# Patient Record
Sex: FEMALE
[De-identification: names, ages, dates, MRNs, and addresses within clinical notes are randomized per-mention and may not be internally consistent; named-entity substitution may affect disease eponyms.]

---

## 2018-04-11 ENCOUNTER — HOSPITAL ENCOUNTER (INPATIENT)
Dept: HOSPITAL 31 - C.ER | Age: 43
LOS: 3 days | Discharge: LEFT BEFORE DISCHARGE | DRG: 89 | End: 2018-04-14
Attending: PSYCHIATRY & NEUROLOGY | Admitting: PSYCHIATRY & NEUROLOGY
Payer: COMMERCIAL

## 2018-04-11 DIAGNOSIS — L85.0: ICD-10-CM

## 2018-04-11 DIAGNOSIS — F41.9: ICD-10-CM

## 2018-04-11 DIAGNOSIS — J18.9: Primary | ICD-10-CM

## 2018-04-11 DIAGNOSIS — F32.9: ICD-10-CM

## 2018-04-11 DIAGNOSIS — R63.4: ICD-10-CM

## 2018-04-11 DIAGNOSIS — F11.10: ICD-10-CM

## 2018-04-11 DIAGNOSIS — F14.90: ICD-10-CM

## 2018-04-11 DIAGNOSIS — Z59.0: ICD-10-CM

## 2018-04-11 DIAGNOSIS — F17.210: ICD-10-CM

## 2018-04-11 LAB
ALBUMIN SERPL-MCNC: 4 G/DL (ref 3.5–5)
ALBUMIN/GLOB SERPL: 1.1 {RATIO} (ref 1–2.1)
ALT SERPL-CCNC: 9 U/L (ref 9–52)
AST SERPL-CCNC: 24 U/L (ref 14–36)
BACTERIA #/AREA URNS HPF: (no result) /[HPF]
BASOPHILS # BLD AUTO: 0.1 K/UL (ref 0–0.2)
BASOPHILS NFR BLD: 0.7 % (ref 0–2)
BILIRUB UR-MCNC: NEGATIVE MG/DL
BUN SERPL-MCNC: 15 MG/DL (ref 7–17)
CALCIUM SERPL-MCNC: 8.6 MG/DL (ref 8.6–10.4)
EOSINOPHIL # BLD AUTO: 0.1 K/UL (ref 0–0.7)
EOSINOPHIL NFR BLD: 0.7 % (ref 0–4)
ERYTHROCYTE [DISTWIDTH] IN BLOOD BY AUTOMATED COUNT: 13.9 % (ref 11.5–14.5)
GFR NON-AFRICAN AMERICAN: > 60
GLUCOSE UR STRIP-MCNC: NORMAL MG/DL
HCG,QUALITATIVE URINE: NEGATIVE
HGB BLD-MCNC: 11.4 G/DL (ref 11–16)
LEUKOCYTE ESTERASE UR-ACNC: NEGATIVE LEU/UL
LYMPHOCYTES # BLD AUTO: 2.6 K/UL (ref 1–4.3)
LYMPHOCYTES NFR BLD AUTO: 17.2 % (ref 20–40)
MCH RBC QN AUTO: 27.7 PG (ref 27–31)
MCHC RBC AUTO-ENTMCNC: 33.4 G/DL (ref 33–37)
MCV RBC AUTO: 83 FL (ref 81–99)
MONOCYTES # BLD: 1 K/UL (ref 0–0.8)
MONOCYTES NFR BLD: 6.8 % (ref 0–10)
NEUTROPHILS # BLD: 11.5 K/UL (ref 1.8–7)
NEUTROPHILS NFR BLD AUTO: 74.6 % (ref 50–75)
NRBC BLD AUTO-RTO: 0 % (ref 0–2)
PH UR STRIP: 6 [PH] (ref 5–8)
PLATELET # BLD: 291 K/UL (ref 130–400)
PMV BLD AUTO: 7 FL (ref 7.2–11.7)
PROT UR STRIP-MCNC: NEGATIVE MG/DL
RBC # BLD AUTO: 4.13 MIL/UL (ref 3.8–5.2)
RBC # UR STRIP: (no result) /UL
SP GR UR STRIP: 1.02 (ref 1–1.03)
SQUAMOUS EPITHIAL: 9 /HPF (ref 0–5)
UROBILINOGEN UR-MCNC: 4 MG/DL (ref 0.2–1)
WBC # BLD AUTO: 15.4 K/UL (ref 4.8–10.8)

## 2018-04-11 SDOH — ECONOMIC STABILITY - HOUSING INSECURITY: HOMELESSNESS: Z59.0

## 2018-04-11 NOTE — CP.PCM.HP
<Liu,Weilin - Last Filed: 04/12/18 01:30>





History of Present Illness





- History of Present Illness


History of Present Illness: 





CC: fever





42 year old female with past medical history of polysubstance abuse presents to 

ED today complaining of fever, chills, sore throat and coughs. Patient reports 

all her symptoms started 3 days ago and have been becoming worse. She has been 

coughing up dark yellow phlegms. Patient is homeless and recently left AMA from 

[facility] detox unit. Patient has been using heroin and cocaine daily for 

the past 2 years. She injects approximately 30 bags of heroin daily. Her last 

known heroin and cocaine use was 3 hours prior to ED arrival. Patient also 

reports to have unintentional weight loss, but unable to quantify the amount of 

period of time. Patient states that she is very tired and fell asleep multiple 

times during the entire H&P. Patient denies headache, shortness of breath, 

chest pain, nausea, vomiting, diarrhea, constipation, or urinary changes.





PMD: none


PMHx: denies


PSHx: denies


Allergy: NKDA


Family Hx: denies


Social Hx: Smokes 1.5 pack of cigarettes daily, alcohol 1 can of beer daily, 

heroin 30 bags daily, cocaine for the past 2 years


Home Meds: none





Present on Admission





- Present on Admission


Any Indicators Present on Admission: No





Review of Systems





- Review of Systems


Systems not reviewed;Unavailable: Other (Somnolence)





- Constitutional


Constitutional: As Per HPI, Chills, Fever, Lethargy, Weight Loss.  absent: 

Headache





- EENT


Eyes: As Per HPI.  absent: Change in Vision, Decreased Night Vision, Discharge


Ears: As Per HPI.  absent: Dizziness


Nose/Mouth/Throat: As Per HPI.  absent: Epistaxis, Nasal Obstruction, Nasal 

Trauma





- Breasts


Breasts: As Per HPI





- Cardiovascular


Cardiovascular: As Per HPI.  absent: Chest Pain, Dyspnea, Edema





- Respiratory


Respiratory: As Per HPI, Cough, Dyspnea.  absent: Hemoptysis





- Gastrointestinal


Gastrointestinal: As Per HPI.  absent: Constipation, Diarrhea, Nausea, Vomiting





- Genitourinary


Genitourinary: As Per HPI.  absent: Dysuria





- Reproductive: Female


Reproductive:Female: As Per HPI





- Menstruation


Menstruation: As Per HPI





- Musculoskeletal


Musculoskeletal: As Per HPI





- Integumentary


Integumentary: As Per HPI, Other (bilateral hands frostbites)





- Neurological


Neurological: As Per HPI.  absent: Dizziness, Syncope, Vertigo





- Psychiatric


Psychiatric: As Per HPI.  absent: Visual Hallucinations





- Endocrine


Endocrine: As Per HPI





- Hematologic/Lymphatic


Hematologic: As Per HPI





Past Patient History





- Past Social History


Smoking Status: Heavy Smoker > 10 Cigarettes Daily





- PSYCHIATRIC


Hx Anxiety: Yes


Hx Depression: Yes


Hx Substance Use: Yes





- SURGICAL HISTORY


Hx Surgeries: No





- ANESTHESIA


Hx Anesthesia: No





Meds


Allergies/Adverse Reactions: 


 Allergies











Allergy/AdvReac Type Severity Reaction Status Date / Time


 


No Known Allergies Allergy   Verified 04/11/18 20:59














Physical Exam





- Constitutional


Appears: Non-toxic, No Acute Distress, Cachectic, Other (Somnolence, poor 

hygiene)





- Head Exam


Head Exam: ATRAUMATIC, NORMOCEPHALIC





- Eye Exam


Eye Exam: EOMI, Normal appearance


Additional comments: 





sluggish pupil reaction to light





- ENT Exam


ENT Exam: Mucous Membranes Moist


Additional comments: 





poor dentition





- Neck Exam


Neck exam: Positive for: Normal Inspection





- Respiratory Exam


Respiratory Exam: Decreased Breath Sounds, Clear to Auscultation Bilateral, 

NORMAL BREATHING PATTERN.  absent: Respiratory Distress





- Cardiovascular Exam


Cardiovascular Exam: REGULAR RHYTHM, +S1, +S2





- GI/Abdominal Exam


GI & Abdominal Exam: Normal Bowel Sounds, Soft.  absent: Tenderness





- Extremities Exam


Extremities exam: Positive for: pedal pulses present.  Negative for: normal 

inspection, tenderness


Additional comments: 





multiple track marks on bilateral forearm and antecubital fossa





- Neurological Exam


Neurological exam: Alert, Oriented x3





- Psychiatric Exam


Psychiatric exam: Normal Affect, Normal Mood





- Skin


Skin Exam: Dry, Warm





Results





- Vital Signs


Recent Vital Signs: 





 Last Vital Signs











Temp  101.2 F H  04/11/18 20:51


 


Pulse  108 H  04/11/18 20:51


 


Resp  16   04/11/18 20:51


 


BP  105/68   04/11/18 20:51


 


Pulse Ox  96   04/11/18 23:10














- Labs


Result Diagrams: 


 04/11/18 22:12





 04/11/18 22:12


Labs: 





 Laboratory Results - last 24 hr











  04/11/18 04/11/18 04/11/18





  21:32 21:32 22:12


 


WBC    15.4 H


 


RBC    4.13


 


Hgb    11.4


 


Hct    34.3


 


MCV    83.0


 


MCH    27.7


 


MCHC    33.4


 


RDW    13.9


 


Plt Count    291


 


MPV    7.0 L


 


Neut % (Auto)    74.6


 


Lymph % (Auto)    17.2 L


 


Mono % (Auto)    6.8


 


Eos % (Auto)    0.7


 


Baso % (Auto)    0.7


 


Neut # (Auto)    11.5 H


 


Lymph # (Auto)    2.6


 


Mono # (Auto)    1.0 H


 


Eos # (Auto)    0.1


 


Baso # (Auto)    0.1


 


Sodium   


 


Potassium   


 


Chloride   


 


Carbon Dioxide   


 


Anion Gap   


 


BUN   


 


Creatinine   


 


Est GFR ( Amer)   


 


Est GFR (Non-Af Amer)   


 


Random Glucose   


 


Lactic Acid   


 


Calcium   


 


Total Bilirubin   


 


AST   


 


ALT   


 


Alkaline Phosphatase   


 


Total Protein   


 


Albumin   


 


Globulin   


 


Albumin/Globulin Ratio   


 


Urine Color  Yellow  


 


Urine Clarity  Hazy  


 


Urine pH  6.0  


 


Ur Specific Gravity  1.025  


 


Urine Protein  Negative  


 


Urine Glucose (UA)  Normal  


 


Urine Ketones  Negative  


 


Urine Blood  2+ H  


 


Urine Nitrate  Negative  


 


Urine Bilirubin  Negative  


 


Urine Urobilinogen  4.0 H  


 


Ur Leukocyte Esterase  Negative  


 


Urine WBC (Auto)  4  


 


Urine RBC (Auto)  12 H  


 


Ur Squamous Epith Cells  9 H  


 


Ur Transition Epith Cell  < 1  


 


Urine Bacteria  Occ H  


 


Urine HCG, Qual  Negative  


 


Urine Opiates Screen   Positive H 


 


Urine Methadone Screen   Negative 


 


Ur Barbiturates Screen   Negative 


 


Ur Phencyclidine Scrn   Negative 


 


Ur Amphetamines Screen   Negative 


 


U Benzodiazepines Scrn   Negative 


 


U Oth Cocaine Metabols   Positive H 


 


U Cannabinoids Screen   Negative 


 


Alcohol, Quantitative   


 


Influenza Typ A,B (EIA)   














  04/11/18 04/11/18 04/11/18





  22:12 22:30 22:56


 


WBC   


 


RBC   


 


Hgb   


 


Hct   


 


MCV   


 


MCH   


 


MCHC   


 


RDW   


 


Plt Count   


 


MPV   


 


Neut % (Auto)   


 


Lymph % (Auto)   


 


Mono % (Auto)   


 


Eos % (Auto)   


 


Baso % (Auto)   


 


Neut # (Auto)   


 


Lymph # (Auto)   


 


Mono # (Auto)   


 


Eos # (Auto)   


 


Baso # (Auto)   


 


Sodium  136  


 


Potassium  4.1  


 


Chloride  98  


 


Carbon Dioxide  25  


 


Anion Gap  17  


 


BUN  15  


 


Creatinine  0.7  


 


Est GFR ( Amer)  > 60  


 


Est GFR (Non-Af Amer)  > 60  


 


Random Glucose  112 H  


 


Lactic Acid    0.5 L


 


Calcium  8.6  


 


Total Bilirubin  0.5  


 


AST  24  


 


ALT  9  


 


Alkaline Phosphatase  81  


 


Total Protein  7.7  


 


Albumin  4.0  


 


Globulin  3.7  


 


Albumin/Globulin Ratio  1.1  


 


Urine Color   


 


Urine Clarity   


 


Urine pH   


 


Ur Specific Gravity   


 


Urine Protein   


 


Urine Glucose (UA)   


 


Urine Ketones   


 


Urine Blood   


 


Urine Nitrate   


 


Urine Bilirubin   


 


Urine Urobilinogen   


 


Ur Leukocyte Esterase   


 


Urine WBC (Auto)   


 


Urine RBC (Auto)   


 


Ur Squamous Epith Cells   


 


Ur Transition Epith Cell   


 


Urine Bacteria   


 


Urine HCG, Qual   


 


Urine Opiates Screen   


 


Urine Methadone Screen   


 


Ur Barbiturates Screen   


 


Ur Phencyclidine Scrn   


 


Ur Amphetamines Screen   


 


U Benzodiazepines Scrn   


 


U Oth Cocaine Metabols   


 


U Cannabinoids Screen   


 


Alcohol, Quantitative  < 10  


 


Influenza Typ A,B (EIA)   Negative for flu a/b 














Assessment & Plan





- Assessment and Plan (Free Text)


Assessment: 





Pneumonia


-Leukocytosis 15.4, fever 101.2, lactate 0.5


-Rapid flu negative


-CXR no active disease


-Rocephin IV 1gm daily


-Azithromycin IV 500mg daily


-Follow up sputum, blood and urine cultures


-Follow up legionella ag, mycoplasma pneumoniae, HIV


-Tylenol for fever





Polysubstance abuse


-UDS positive for opiates and cocaine


-Smoking cessation was strongly advised


-Nicoderm patch daily





Unintentional weight loss


-history IVDA


-Follow up HIV


-monitor labs





Prophylactic measures


-Protonix


-Lovenox





<Lewis Sheridan - Last Filed: 04/12/18 06:04>





Results





- Vital Signs


Recent Vital Signs: 





 Last Vital Signs











Temp  98.6 F   04/12/18 00:24


 


Pulse  74   04/12/18 00:24


 


Resp  20   04/12/18 00:24


 


BP  100/61   04/12/18 00:24


 


Pulse Ox  99   04/12/18 00:24














- Labs


Result Diagrams: 


 04/11/18 22:12





 04/11/18 22:12


Labs: 





 Laboratory Results - last 24 hr











  04/11/18 04/11/18 04/11/18





  21:32 21:32 22:12


 


WBC    15.4 H


 


RBC    4.13


 


Hgb    11.4


 


Hct    34.3


 


MCV    83.0


 


MCH    27.7


 


MCHC    33.4


 


RDW    13.9


 


Plt Count    291


 


MPV    7.0 L


 


Neut % (Auto)    74.6


 


Lymph % (Auto)    17.2 L


 


Mono % (Auto)    6.8


 


Eos % (Auto)    0.7


 


Baso % (Auto)    0.7


 


Neut # (Auto)    11.5 H


 


Lymph # (Auto)    2.6


 


Mono # (Auto)    1.0 H


 


Eos # (Auto)    0.1


 


Baso # (Auto)    0.1


 


Sodium   


 


Potassium   


 


Chloride   


 


Carbon Dioxide   


 


Anion Gap   


 


BUN   


 


Creatinine   


 


Est GFR ( Amer)   


 


Est GFR (Non-Af Amer)   


 


POC Glucose (mg/dL)   


 


Random Glucose   


 


Lactic Acid   


 


Calcium   


 


Total Bilirubin   


 


AST   


 


ALT   


 


Alkaline Phosphatase   


 


Total Protein   


 


Albumin   


 


Globulin   


 


Albumin/Globulin Ratio   


 


Urine Color  Yellow  


 


Urine Clarity  Hazy  


 


Urine pH  6.0  


 


Ur Specific Gravity  1.025  


 


Urine Protein  Negative  


 


Urine Glucose (UA)  Normal  


 


Urine Ketones  Negative  


 


Urine Blood  2+ H  


 


Urine Nitrate  Negative  


 


Urine Bilirubin  Negative  


 


Urine Urobilinogen  4.0 H  


 


Ur Leukocyte Esterase  Negative  


 


Urine WBC (Auto)  4  


 


Urine RBC (Auto)  12 H  


 


Ur Squamous Epith Cells  9 H  


 


Ur Transition Epith Cell  < 1  


 


Urine Bacteria  Occ H  


 


Urine HCG, Qual  Negative  


 


Urine Opiates Screen   Positive H 


 


Urine Methadone Screen   Negative 


 


Ur Barbiturates Screen   Negative 


 


Ur Phencyclidine Scrn   Negative 


 


Ur Amphetamines Screen   Negative 


 


U Benzodiazepines Scrn   Negative 


 


U Oth Cocaine Metabols   Positive H 


 


U Cannabinoids Screen   Negative 


 


Alcohol, Quantitative   


 


Influenza Typ A,B (EIA)   














  04/11/18 04/11/18 04/11/18





  22:12 22:23 22:30


 


WBC   


 


RBC   


 


Hgb   


 


Hct   


 


MCV   


 


MCH   


 


MCHC   


 


RDW   


 


Plt Count   


 


MPV   


 


Neut % (Auto)   


 


Lymph % (Auto)   


 


Mono % (Auto)   


 


Eos % (Auto)   


 


Baso % (Auto)   


 


Neut # (Auto)   


 


Lymph # (Auto)   


 


Mono # (Auto)   


 


Eos # (Auto)   


 


Baso # (Auto)   


 


Sodium  136  


 


Potassium  4.1  


 


Chloride  98  


 


Carbon Dioxide  25  


 


Anion Gap  17  


 


BUN  15  


 


Creatinine  0.7  


 


Est GFR ( Amer)  > 60  


 


Est GFR (Non-Af Amer)  > 60  


 


POC Glucose (mg/dL)   97 


 


Random Glucose  112 H  


 


Lactic Acid   


 


Calcium  8.6  


 


Total Bilirubin  0.5  


 


AST  24  


 


ALT  9  


 


Alkaline Phosphatase  81  


 


Total Protein  7.7  


 


Albumin  4.0  


 


Globulin  3.7  


 


Albumin/Globulin Ratio  1.1  


 


Urine Color   


 


Urine Clarity   


 


Urine pH   


 


Ur Specific Gravity   


 


Urine Protein   


 


Urine Glucose (UA)   


 


Urine Ketones   


 


Urine Blood   


 


Urine Nitrate   


 


Urine Bilirubin   


 


Urine Urobilinogen   


 


Ur Leukocyte Esterase   


 


Urine WBC (Auto)   


 


Urine RBC (Auto)   


 


Ur Squamous Epith Cells   


 


Ur Transition Epith Cell   


 


Urine Bacteria   


 


Urine HCG, Qual   


 


Urine Opiates Screen   


 


Urine Methadone Screen   


 


Ur Barbiturates Screen   


 


Ur Phencyclidine Scrn   


 


Ur Amphetamines Screen   


 


U Benzodiazepines Scrn   


 


U Oth Cocaine Metabols   


 


U Cannabinoids Screen   


 


Alcohol, Quantitative  < 10  


 


Influenza Typ A,B (EIA)    Negative for flu a/b














  04/11/18





  22:56


 


WBC 


 


RBC 


 


Hgb 


 


Hct 


 


MCV 


 


MCH 


 


MCHC 


 


RDW 


 


Plt Count 


 


MPV 


 


Neut % (Auto) 


 


Lymph % (Auto) 


 


Mono % (Auto) 


 


Eos % (Auto) 


 


Baso % (Auto) 


 


Neut # (Auto) 


 


Lymph # (Auto) 


 


Mono # (Auto) 


 


Eos # (Auto) 


 


Baso # (Auto) 


 


Sodium 


 


Potassium 


 


Chloride 


 


Carbon Dioxide 


 


Anion Gap 


 


BUN 


 


Creatinine 


 


Est GFR ( Amer) 


 


Est GFR (Non-Af Amer) 


 


POC Glucose (mg/dL) 


 


Random Glucose 


 


Lactic Acid  0.5 L


 


Calcium 


 


Total Bilirubin 


 


AST 


 


ALT 


 


Alkaline Phosphatase 


 


Total Protein 


 


Albumin 


 


Globulin 


 


Albumin/Globulin Ratio 


 


Urine Color 


 


Urine Clarity 


 


Urine pH 


 


Ur Specific Gravity 


 


Urine Protein 


 


Urine Glucose (UA) 


 


Urine Ketones 


 


Urine Blood 


 


Urine Nitrate 


 


Urine Bilirubin 


 


Urine Urobilinogen 


 


Ur Leukocyte Esterase 


 


Urine WBC (Auto) 


 


Urine RBC (Auto) 


 


Ur Squamous Epith Cells 


 


Ur Transition Epith Cell 


 


Urine Bacteria 


 


Urine HCG, Qual 


 


Urine Opiates Screen 


 


Urine Methadone Screen 


 


Ur Barbiturates Screen 


 


Ur Phencyclidine Scrn 


 


Ur Amphetamines Screen 


 


U Benzodiazepines Scrn 


 


U Oth Cocaine Metabols 


 


U Cannabinoids Screen 


 


Alcohol, Quantitative 


 


Influenza Typ A,B (EIA) 














Assessment & Plan





- Date & Time


Date: 04/12/18 (I have seen and examined the patient.  I agree with the 

findings and plan of care as documented by Dr. Calle.  Patient with pneumonia.  

Rocephin and Azithromycin.  Oxygen as needed.  Sputum and blood cultures.  Also 

with polysubstance abuse.  Consider psych consult.  Monitor for acute changes.)


Time: 06:03





Attending/Attestation





- Attestation


I have personally seen and examined this patient.: Yes


I have fully participated in the care of the patient.: Yes


I have reviewed all pertinent clinical information: Yes

## 2018-04-11 NOTE — C.PDOC
History Of Present Illness


42 year old female brought to the ED by friend for evaluation of cough and cold 

symptoms x 2 days. Of note, patient is homeless and has a history of heroin and 

crack cocaine abuse. Patient states she has been living on the street instead 

of staying in a shelter. On arrival, patient is febrile, with temp of 101. 

Patient also reports significant weight loss over the past month. 





Time Seen by Provider: 04/11/18 21:16


Chief Complaint (Nursing): Flu-like Symptoms


History Per: Patient


History/Exam Limitations: no limitations


Onset/Duration Of Symptoms: Days (x2)


Current Symptoms Are (Timing): Still Present


Associated Symptoms: Fever, Cough





Past Medical History


Reviewed: Historical Data, Nursing Documentation, Vital Signs


Vital Signs: 


 Last Vital Signs











Temp  101.2 F H  04/11/18 20:51


 


Pulse  108 H  04/11/18 20:51


 


Resp  16   04/11/18 20:51


 


BP  105/68   04/11/18 20:51


 


Pulse Ox  96   04/11/18 22:44














- Medical History


PMH: Anxiety, Depression


Surgical History: No Surg Hx


Family History: States: No Known Family Hx





- Social History


Hx Tobacco Use: Yes


Hx Alcohol Use: Yes


Hx Substance Use: Yes





- Immunization History


Hx Influenza Vaccination: No


Hx Pneumococcal Vaccination: No





Review Of Systems


Except As Marked, All Systems Reviewed And Found Negative.


Constitutional: Positive for: Fever


ENT: Positive for: Nose Congestion


Cardiovascular: Negative for: Chest Pain


Respiratory: Positive for: Cough.  Negative for: Shortness of Breath


Gastrointestinal: Negative for: Vomiting, Diarrhea





Physical Exam





- Physical Exam


Appears: No Acute Distress, Other (Cachectic appearing)


Skin: Warm, Dry, No Rash


Head: Atraumatic, Normacephalic


Eye(s): bilateral: Normal Inspection, PERRL, EOMI


Nose: Normal


Oral Mucosa: Moist


Neck: Normal ROM


Chest: Symmetrical


Cardiovascular: Rhythm Regular, No Murmur


Respiratory: Normal Breath Sounds, No Rales, No Rhonchi, No Wheezing, Other (

Dry cough noted)


Gastrointestinal/Abdominal: Soft, No Tenderness, No Distention


Extremity: Bilateral: Atraumatic, Normal Color And Temperature, Normal ROM


Pulses: Left Dorsalis Pedis: Normal, Right Dorsalis Pedis: Normal


Neurological/Psych: Oriented x3, Normal Speech





ED Course And Treatment





- Laboratory Results


Result Diagrams: 


 04/11/18 22:12





 04/11/18 22:12


Lab Interpretation: Abnormal (WBC 15.4, mild microscopic hematuria, UDS + 

cocaine and opiates)


O2 Sat by Pulse Oximetry: 96 (RA)


Pulse Ox Interpretation: Normal





- Radiology


CXR: Interpreted by Me


CXR Interpretation: Yes: No Acute Disease


Reevaluation Time: 22:43


Reassessment Condition: Unchanged (after IV fluids)





- Physician Consult Information


Time Consulting Physician Contacted: 22:43


Physician Contacted: Lewis Sheridan


Outcome Of Conversation: Patient to be admitted for treatment of respiratory 

infection and polysubstance abuse.





Medical Decision Making


Medical Decision Making: 


Impression: 41 y/o F with fever and cough





Time: 21:24





Plan:


* CMP 


* CBC


* Urine drug screen


* Alcohol serum


* Chest x-ray


* Blood culture


* Urine culture


* UA 


* Urine HCG


* IVF hydration








Disposition





- Disposition


Disposition: HOSPITALIZED


Disposition Time: 22:44


Condition: FAIR





- POA


Present On Arrival: None





- Clinical Impression


Clinical Impression: 


 Influenza-like illness, Cough, Weight loss, Polysubstance (including opioids) 

dependence with physiol dependence








- Scribe Statement


The provider has reviewed the documentation as recorded by the Scribe (Emma Antunez)


Provider Attestation: 





All medical record entries made by the Scribe were at my direction and 

personally dictated by me. I have reviewed the chart and agree that the record 

accurately reflects my personal performance of the history, physical exam, 

medical decision making, and the department course for this patient. I have 

also personally directed, reviewed, and agree with the discharge instructions 

and disposition.

## 2018-04-12 LAB
ALBUMIN SERPL-MCNC: 3.1 G/DL (ref 3.5–5)
ALBUMIN/GLOB SERPL: 0.9 {RATIO} (ref 1–2.1)
ALT SERPL-CCNC: 10 U/L (ref 9–52)
AST SERPL-CCNC: 19 U/L (ref 14–36)
BASOPHILS # BLD AUTO: 0.1 K/UL (ref 0–0.2)
BASOPHILS NFR BLD: 0.4 % (ref 0–2)
BUN SERPL-MCNC: 7 MG/DL (ref 7–17)
CALCIUM SERPL-MCNC: 8.5 MG/DL (ref 8.6–10.4)
EOSINOPHIL # BLD AUTO: 0.1 K/UL (ref 0–0.7)
EOSINOPHIL NFR BLD: 0.9 % (ref 0–4)
ERYTHROCYTE [DISTWIDTH] IN BLOOD BY AUTOMATED COUNT: 14.1 % (ref 11.5–14.5)
GFR NON-AFRICAN AMERICAN: > 60
HEPATITIS A IGM: NEGATIVE
HEPATITIS B CORE AB: NEGATIVE
HEPATITIS C ANTIBODY: NEGATIVE
HGB BLD-MCNC: 11.4 G/DL (ref 11–16)
L PNEUMO1 AG UR QL IA: NEGATIVE
LYMPHOCYTES # BLD AUTO: 1.9 K/UL (ref 1–4.3)
LYMPHOCYTES NFR BLD AUTO: 16.1 % (ref 20–40)
MCH RBC QN AUTO: 27.9 PG (ref 27–31)
MCHC RBC AUTO-ENTMCNC: 33.3 G/DL (ref 33–37)
MCV RBC AUTO: 83.7 FL (ref 81–99)
MONOCYTES # BLD: 1 K/UL (ref 0–0.8)
MONOCYTES NFR BLD: 8.3 % (ref 0–10)
NEUTROPHILS # BLD: 8.8 K/UL (ref 1.8–7)
NEUTROPHILS NFR BLD AUTO: 74.3 % (ref 50–75)
NRBC BLD AUTO-RTO: 0 % (ref 0–2)
PLATELET # BLD: 280 K/UL (ref 130–400)
PMV BLD AUTO: 7.4 FL (ref 7.2–11.7)
RAPID PLASMA REAGIN: NONREACTIVE
RBC # BLD AUTO: 4.08 MIL/UL (ref 3.8–5.2)
WBC # BLD AUTO: 11.9 K/UL (ref 4.8–10.8)

## 2018-04-12 RX ADMIN — WHITE PETROLATUM PRN GM: 1 OINTMENT TOPICAL at 18:47

## 2018-04-12 RX ADMIN — PANTOPRAZOLE SODIUM SCH MG: 40 TABLET, DELAYED RELEASE ORAL at 10:40

## 2018-04-12 RX ADMIN — PANTOPRAZOLE SODIUM SCH: 40 TABLET, DELAYED RELEASE ORAL at 10:51

## 2018-04-12 RX ADMIN — ENOXAPARIN SODIUM SCH MG: 40 INJECTION SUBCUTANEOUS at 10:40

## 2018-04-12 NOTE — RAD
Chest x-ray two views 



History: Shortness of breath. 



Comparison: None available. 



Findings: 



Mild diffuse increased interstitial lung markings which may represent 

mild venous congestion. 



Small nodular density in the left infrahilar region may represent 

confluence of shadows with ribs and vessels. 



Bibasilar breast and nipple shadows. 



Bilateral hilar prominence. 



Heart size within normal limits. 



Degenerative changes in the spine. 



Impression: 



Mild diffuse increased interstitial lung markings which may represent 

mild venous congestion. 



Small nodular density in the left infrahilar region may represent 

confluence of shadows with ribs and vessels. 



Bibasilar breast and nipple shadows. 



Bilateral hilar prominence. 



Heart size within normal limits.

## 2018-04-12 NOTE — PCM.PSYCH
Initial Psychiatric Evaluation





- Initial Psychiatric Evaluation


Type of Admission: Voluntary


Legal Status: Capacity


Chief Complaint (in patient's own words): 





Consult for Heroin Withdrawal


History of Present Illness and Precipitating Events: 


This is a 42 year old female, who is unemployed and homeless, who came to the 

ED on 4/11/2018 for cough. Patient has history of opioid use disorder. Patient 

states she uses 30 bags of heroin a day for the past two years; she is an IV 

drug user. She states she also uses cocaine but not every day. She denied using 

marijuana, PCP, pain pills, or xanax. Patient last used before arriving at the 

hospital. Patient attended detox at [facility] but left AMA after 2 

days of treatment. She states they were not detoxing her there. Patient does 

not currently have withdrawal symptoms. She continues to ask for methadone even 

after given 10mg by medicine team.





Patient denied past psychiatric hospitalizations. Patient denies hallucinations

, paranoia, and suicidal ideation. 





psych history: Opioid Use Disorder


medical history: denied


Social history: Patient is currently unemployed and homeless. She admits to 

smoking cigarettes 1.5 PPD. She consumes 1 beer per day. 





Current Medications: 





Active Medications











Generic Name Dose Route Start Last Admin





  Trade Name Freq  PRN Reason Stop Dose Admin


 


Acetaminophen  650 mg  04/12/18 00:17  





  Tylenol 325mg Tab  PO   





  Q6 PRN   





  Fever >100.4 F   


 


Enoxaparin Sodium  40 mg  04/12/18 10:00  





  Lovenox  SC   





  DAILY COTY   


 


Sodium Chloride  1,000 mls @ 100 mls/hr  04/11/18 23:45  04/12/18 00:07





  Sodium Chloride 0.9%  IV   100 mls/hr





  .Q10H COTY   Administration


 


Ceftriaxone Sodium 1 gm/  50 mls @ 100 mls/hr  04/12/18 10:00  





  Ceftriaxone Sodium  IVPB   





  DAILY COTY   





  Protocol   


 


Azithromycin 500 mg/ Sodium  250 mls @ 250 mls/hr  04/12/18 10:00  





  Chloride  IVPB   





  DAILY COTY   





  Protocol   


 


Nicotine  1 patch  04/12/18 10:00  





  Nicoderm Cq  TD   





  DAILY COTY   


 


Pantoprazole Sodium  40 mg  04/12/18 10:00  





  Protonix Ec Tab  PO   





  DAILY COTY   


 


Pneumococcal Polyvalent Vaccine  0.5 ml  04/13/18 10:00  





  Pneumovax 23 Vaccine  IM  04/13/18 10:01  





  .ONCE ONE   














Past Psychiatric History





- Past Psychiatric History


Previous Treatment History: None


History of ETOH/Drug Use: 


Opioid Use Disorder, severe





Pertinent Medical Hx (Current Medical&Sleep Prob, Allergies): 





 Allergies











Allergy/AdvReac Type Severity Reaction Status Date / Time


 


No Known Allergies Allergy   Verified 04/11/18 20:59








 





No Known Home Med  04/11/18 











Review of Systems





- Review of Systems


All systems: reviewed and no additional remarkable complaints except





- Psychiatric


Psychiatric: Anxiety, Irritability.  absent: Auditory Hallucinations, 

Hallucinations, Paranoia, Suicidal Ideation, Visual Hallucinations





Mental Status Examination





- Personal Presentation


Personal Presentation: Looks stated age





- Affect


Affect: Constricted





- Motor Activity


Motor Activity: Calm





- Reliability in Providing Information


Reliability in Providing Information: Fair





- Speech


Speech: Organized





- Mood


Mood: Anxious





- Formal Thought Process


Formal Thought Process: No Impairment





- Obsessions/Compulsions


Obsessions: No


Compulsions: No





- Cognitive Functions


Orientation: Person, Place, Situation, Time


Sensorium: Alert


Attention/Concentration: Attentive


Abstract Thinking: [place]


Estimate of Intelligence: Below average


Judgement: Imparied, as evidence by: Poor judgement, Intact, as evidence by: 

Insight regarding need for hospitalization





- Risk


Risk: Diminished functioning





- Limitations


Limitations: Living alone





DSM 5 DX





- DSM 5


DSM 5 Diagnosis: 





Opioid Use Disorder, severe


Opiod withdrawal





- Recommended/Plan of Treatment


Treatment Recommendations and Plan of Treatment: 





As per doctor and medical staff, patient is not showing any signs of withdrawal 

from opiates. 


Patient is trying to obtain methadone without showing symptoms of withdrawal.


Patient discussed with medical team.  








Pt psychiatrically stable and clear. 





- Smoking Cessation


Smoking Cessation Initiated: No

## 2018-04-12 NOTE — CP.PCM.PN
Subjective





- Date & Time of Evaluation


Date of Evaluation: 04/12/18


Time of Evaluation: 07:00





- Subjective


Subjective: 





PGY1- Medicine Note for Dr. Baure


Patient seen and examined at bedside and in no acute distress. Patient is 

starting to get anxious and says she feels like she is going into withdrawals 

because she is sweating. Patient denies any sob, chest pain, nausea, vomiting, 

constipation, or diarrhea. 





Objective





- Vital Signs/Intake and Output


Vital Signs (last 24 hours): 


 











Temp Pulse Resp BP Pulse Ox


 


 98.4 F   72   20   105/68   97 


 


 04/12/18 08:07  04/12/18 08:07  04/12/18 08:07  04/12/18 08:07  04/12/18 08:07








Intake and Output: 


 











 04/12/18 04/12/18





 06:59 18:59


 


Intake Total 1100 


 


Balance 1100 














- Medications


Medications: 


 Current Medications





Acetaminophen (Tylenol 325mg Tab)  650 mg PO Q6 PRN


   PRN Reason: Fever >100.4 F


Enoxaparin Sodium (Lovenox)  40 mg SC DAILY [facility]


Sodium Chloride (Sodium Chloride 0.9%)  1,000 mls @ 100 mls/hr IV .Q10H [facility]


   Last Admin: 04/12/18 00:07 Dose:  100 mls/hr


Ceftriaxone Sodium 1 gm/ (Ceftriaxone Sodium)  50 mls @ 100 mls/hr IVPB DAILY 

[facility]


   PRN Reason: Protocol


Azithromycin 500 mg/ Sodium (Chloride)  250 mls @ 250 mls/hr IVPB DAILY [facility]


   PRN Reason: Protocol


Nicotine (Nicoderm Cq)  1 patch TD DAILY [facility]


Pantoprazole Sodium (Protonix Ec Tab)  40 mg PO DAILY [facility]


Pneumococcal Polyvalent Vaccine (Pneumovax 23 Vaccine)  0.5 ml IM .ONCE ONE


   Stop: 04/13/18 10:01











- Labs


Labs: 


 





 04/12/18 07:00 





 04/12/18 07:00 











- Constitutional


Appears: Non-toxic, Unkempt





- Head Exam


Head Exam: ATRAUMATIC, NORMAL INSPECTION, NORMOCEPHALIC





- Eye Exam


Pupil Exam: Miosis


Additional comments: 





sluggish pupillary reflex 





- Respiratory Exam


Respiratory Exam: Rales (mild left lower lobe crackles ), NORMAL BREATHING 

PATTERN





- Cardiovascular Exam


Cardiovascular Exam: REGULAR RHYTHM, RRR, +S1, +S2





- GI/Abdominal Exam


GI & Abdominal Exam: Soft, Normal Bowel Sounds.  absent: Tenderness





- Extremities Exam


Extremities Exam: Normal Inspection.  absent: Calf Tenderness, Pedal Edema





- Neurological Exam


Neurological Exam: Alert, Awake, Oriented x3





- Psychiatric Exam


Psychiatric exam: Anxious, Normal Affect





- Skin


Skin Exam: Dry, Warm


Additional comments: 





hyperpigmented, dry, cracked skin over knuckles


track marks on b/l forearms 





Assessment and Plan





- Assessment and Plan (Free Text)


Assessment: 


Pneumonia vs Bronchitis 


-Leukocytosis 15.4, fever 101.2, lactate 0.5


-Rapid flu negative


-CXR no active disease


-Rocephin IV 1gm daily


-Azithromycin IV 500mg daily


-Follow up sputum, blood, and urine cultures


-Follow up legionella ag, mycoplasma pneumoniae


-f/u RPR 


-Tylenol for fever





Polysubstance abuse


-UDS positive for opiates and cocaine


-Smoking cessation was strongly advised


-Nicoderm patch daily


-psych consulted, Dr. Chavarria, help appreciated


-10mg Methadone given--patient not in active withdrawals, continue 

recommendations as per psych





Unintentional weight loss


-history IVDA


-HIV negative 


-hep panel negative


-monitor labs





Xeroderma of the hands b/l


-Vaseline ointment 


-patient to soak hands daily in soap and water 





Prophylactic measures


-Protonix


-Lovenox

## 2018-04-13 LAB
ALBUMIN SERPL-MCNC: 3.6 G/DL (ref 3.5–5)
ALBUMIN/GLOB SERPL: 1 {RATIO} (ref 1–2.1)
ALT SERPL-CCNC: < 6 U/L (ref 9–52)
AST SERPL-CCNC: 18 U/L (ref 14–36)
BASOPHILS # BLD AUTO: 0.1 K/UL (ref 0–0.2)
BASOPHILS NFR BLD: 0.7 % (ref 0–2)
BUN SERPL-MCNC: 5 MG/DL (ref 7–17)
CALCIUM SERPL-MCNC: 9.1 MG/DL (ref 8.6–10.4)
EOSINOPHIL # BLD AUTO: 0.1 K/UL (ref 0–0.7)
EOSINOPHIL NFR BLD: 1 % (ref 0–4)
ERYTHROCYTE [DISTWIDTH] IN BLOOD BY AUTOMATED COUNT: 13.9 % (ref 11.5–14.5)
GFR NON-AFRICAN AMERICAN: > 60
HGB BLD-MCNC: 12 G/DL (ref 11–16)
LYMPHOCYTES # BLD AUTO: 1.5 K/UL (ref 1–4.3)
LYMPHOCYTES NFR BLD AUTO: 17.3 % (ref 20–40)
MCH RBC QN AUTO: 28.3 PG (ref 27–31)
MCHC RBC AUTO-ENTMCNC: 33.7 G/DL (ref 33–37)
MCV RBC AUTO: 83.9 FL (ref 81–99)
MONOCYTES # BLD: 0.6 K/UL (ref 0–0.8)
MONOCYTES NFR BLD: 6.4 % (ref 0–10)
MYCOPLASMA PNEUMONIAE IGM: NEGATIVE
NEUTROPHILS # BLD: 6.5 K/UL (ref 1.8–7)
NEUTROPHILS NFR BLD AUTO: 74.6 % (ref 50–75)
NRBC BLD AUTO-RTO: 0 % (ref 0–2)
PLATELET # BLD: 326 K/UL (ref 130–400)
PMV BLD AUTO: 7.6 FL (ref 7.2–11.7)
RBC # BLD AUTO: 4.24 MIL/UL (ref 3.8–5.2)
WBC # BLD AUTO: 8.7 K/UL (ref 4.8–10.8)

## 2018-04-13 RX ADMIN — WHITE PETROLATUM PRN GM: 1 OINTMENT TOPICAL at 09:39

## 2018-04-13 RX ADMIN — ENOXAPARIN SODIUM SCH MG: 40 INJECTION SUBCUTANEOUS at 09:25

## 2018-04-13 RX ADMIN — PANTOPRAZOLE SODIUM SCH MG: 40 TABLET, DELAYED RELEASE ORAL at 09:25

## 2018-04-13 NOTE — PCM.PYCHPN
Psychiatric Progress Note





- Psychiatric Progress Note


Patient seen today, length of contact: 16 min


Patient Chief Complaint: 





"Withdrawing"


Problems Identified/Issues Discussed: 





The pt is seen, chart reviewed, case discussed with staff.


Transferred form [facility]


Second dose of 10 mg methadone given


Taper will start tomorrow


The pt is compliant with medications and reports no side-effects.


Symptoms are improving but needs more time to stabilize. 


After care discussed, support and psychoeducation given.


Medication Change: Yes (detox changes daily)


Medical Record Reviewed: Yes





Mental Status Examination





- Cognitive Function


Orientation: Person, Place, Situation, Time


Memory: Intact


Attention: Poor


Concentration: Poor


Association: WNL


Fund of Knowledge: Poor





- Mood


Mood: Anxious





- Affect


Affect: Constricted





- Speech


Speech: Appropriate





- Formal Thought Process


Formal Thought Process: No Impairment





- Suicidal Ideation


Suicidal Ideation: No





- Homicidal Ideation


Homicidal Ideation: No





Goal/Treatment Plan





- Goal/Treatment Plan


Need for Continued Stay: Discharge may exacerbated symptoms, Severe functional 

impairment


Progress Toward Problem(s) and Goals/Treatment Plan: 





Taper with methadone


Gabapentin for augmentation if needed


As needed medications


All risks, benefits and alternatives of the meds discussed,


 and the pt agreed and understood. 


Attend groups and activities


Supportive therapy and psychoeducation


MI for abstinence


CBT for relapse prevention


Encourage MAT


Refer to rehab or IOP, and self-help groups


Smoking cessation with MI


Nicotine patch if needed


34 min


Estimated Date of D/C: 04/17/18





- Smoking Cessation


Smoking Cessation Initiated: Yes

## 2018-04-13 NOTE — CP.PCM.PN
<Lety Rodriguez - Last Filed: 04/13/18 17:30>





Subjective





- Date & Time of Evaluation


Date of Evaluation: 04/13/18


Time of Evaluation: 07:00





- Subjective


Subjective: 





PGY1- Note for Dr. Beltran





Patient seen and examined and very agitated. Patient getting dressed in street 

clothes because she wants to leave. Patient feels she is not getting enough 

medication for her withdrawal. Patient calms down and is able to express that 

she feels very hot and cold from her withdrawal. She denies any chest pain, 

abdominal pain, nausea, vomiting, constipation, or diarrhea. 





Objective





- Vital Signs/Intake and Output


Vital Signs (last 24 hours): 


 











Temp Pulse Resp BP Pulse Ox


 


 98.6 F   92 H  20   127/82   97 


 


 04/13/18 07:39  04/13/18 07:39  04/13/18 07:39  04/13/18 07:39  04/13/18 07:39








Intake and Output: 


 











 04/13/18 04/13/18





 06:59 18:59


 


Intake Total 1800 


 


Balance 1800 














- Medications


Medications: 


 Current Medications





Acetaminophen (Tylenol 325mg Tab)  650 mg PO Q6 PRN


   PRN Reason: Fever >100.4 F


Al Hydrox/Mg Hydrox/Simethicone (Maalox 30 Ml)  30 ml PO TID PRN


   PRN Reason: Indigestion / Heartburn


Clonidine HCl (Catapres)  0.1 mg PO Q8 PRN


   PRN Reason: COWS Score More or Equal to 5


Emollient Ointment (Vaseline Oint)  5 gm TOP BID PRN


   PRN Reason: Dry skin


   Last Admin: 04/13/18 09:39 Dose:  5 gm


Enoxaparin Sodium (Lovenox)  40 mg SC DAILY [facility]


   Last Admin: 04/13/18 09:25 Dose:  40 mg


Loperamide HCl (Imodium)  2 mg PO Q8 PRN


   PRN Reason: Diarrhea


Methadone HCl (Methadone)  10 mg PO ONCE ONE


   Stop: 04/13/18 14:01


Methadone HCl (Methadone)  15 mg PO Q24H [facility]


   PRN Reason: Taper


   Stop: 04/18/18 09:59


Nicotine (Nicoderm Cq)  1 patch TD DAILY [facility]


   Last Admin: 04/13/18 09:25 Dose:  1 patch


Ondansetron HCl (Zofran Tab)  4 mg PO Q8 PRN


   PRN Reason: Nausea/Vomiting


Pantoprazole Sodium (Protonix Ec Tab)  40 mg PO DAILY [facility]


   Last Admin: 04/13/18 09:25 Dose:  40 mg











- Labs


Labs: 


 





 04/13/18 07:43 





 04/13/18 07:43 











- Additional Findings


Additional findings: 





- Constitutional


Appears: Non-toxic, Unkempt





- Head Exam


Head Exam: ATRAUMATIC, NORMAL INSPECTION, NORMOCEPHALIC





- Eye Exam


Pupil Exam: Miosis


Additional comments: 





sluggish pupillary reflex 





- Respiratory Exam


Respiratory Exam: Rales (mild left lower lobe crackles ), NORMAL BREATHING 

PATTERN





- Cardiovascular Exam


Cardiovascular Exam: REGULAR RHYTHM, RRR, +S1, +S2





- GI/Abdominal Exam


GI & Abdominal Exam: Soft, Normal Bowel Sounds.  absent: Tenderness





- Extremities Exam


Extremities Exam: Normal Inspection.  absent: Calf Tenderness, Pedal Edema





- Neurological Exam


Neurological Exam: Alert, Awake, Oriented x3





- Psychiatric Exam


Psychiatric exam: Anxious, Normal Affect





- Skin


Skin Exam: Dry, Warm


Additional comments: 





verrucae over knuckles bilaterally 


track marks on b/l forearms 








Assessment and Plan





- Assessment and Plan (Free Text)


Assessment: 


URI


-resolving, afebrile


-Leukocytosis resolved- 8.7


-Rapid flu negative


-CXR no active disease


-antibiotics discontinued


-urine and blood cultures negative for 24 hours


-legionella ag, mycoplasma pneumoniae negative


-RPR negative





Polysubstance abuse


-patient admitted to detox as per Dr. Gallagher 


-UDS positive for opiates and cocaine


-Smoking cessation was strongly advised


-Nicoderm patch daily


-psych consulted, Dr. Chavarria, help appreciated


-10mg Methadone given--patient not in active withdrawals, continue 

recommendations as per psych





Unintentional weight loss


-history IVDA


-HIV negative 


-hep panel negative


-monitor labs





Verrucae of the hands b/l


-patient does not agree with the assessment


-patient to get treatment outpatient if agrees





Prophylactic measures


-Protonix


-Lovenox





Dispo: patient stable from medical standpoint. Patient to be transferred to Dr. Gallagher's service in Detox. 





Plan discussed with Dr. Beltran 








<Luis Antonio Beltran - Last Filed: 04/13/18 19:27>





Objective





- Vital Signs/Intake and Output


Vital Signs (last 24 hours): 


 











Temp Pulse Resp BP Pulse Ox


 


 97.6 F   55 L  20   110/56 L  99 


 


 04/13/18 16:30  04/13/18 16:30  04/13/18 16:30  04/13/18 16:30  04/13/18 16:30








Intake and Output: 


 











 04/13/18 04/14/18





 18:59 06:59


 


Intake Total 860 


 


Balance 860 














- Medications


Medications: 


 Current Medications





Acetaminophen (Tylenol 325mg Tab)  650 mg PO Q6 PRN


   PRN Reason: Fever >100.4 F


Al Hydrox/Mg Hydrox/Simethicone (Maalox 30 Ml)  30 ml PO TID PRN


   PRN Reason: Indigestion / Heartburn


Clonidine HCl (Catapres)  0.1 mg PO Q8 PRN


   PRN Reason: COWS Score More or Equal to 5


   Last Admin: 04/13/18 16:33 Dose:  0.1 mg


Emollient Ointment (Vaseline Oint)  5 gm TOP BID PRN


   PRN Reason: Dry skin


   Last Admin: 04/13/18 09:39 Dose:  5 gm


Enoxaparin Sodium (Lovenox)  40 mg SC DAILY [facility]


   Last Admin: 04/13/18 09:25 Dose:  40 mg


Hydroxyzine HCl (Atarax)  50 mg PO Q6H PRN


   PRN Reason: Anxiety


   Last Admin: 04/13/18 16:33 Dose:  50 mg


Loperamide HCl (Imodium)  2 mg PO Q8 PRN


   PRN Reason: Diarrhea


Methadone HCl (Methadone)  15 mg PO Q24H [facility]


   PRN Reason: Taper


   Stop: 04/18/18 09:59


Nicotine (Nicoderm Cq)  1 patch TD DAILY [facility]


   Last Admin: 04/13/18 09:25 Dose:  1 patch


Ondansetron HCl (Zofran Tab)  4 mg PO Q8 PRN


   PRN Reason: Nausea/Vomiting


Pantoprazole Sodium (Protonix Ec Tab)  40 mg PO DAILY [facility]


   Last Admin: 04/13/18 09:25 Dose:  40 mg











- Labs


Labs: 


 





 04/13/18 07:43 





 04/13/18 07:43 











Attending/Attestation





- Attestation


I have personally seen and examined this patient.: Yes


I have fully participated in the care of the patient.: Yes


I have reviewed all pertinent clinical information, including history, physical 

exam and plan: Yes


Notes (Text): 





04/13/18 19:25


Patient was seen and examined along with the resident.





Exam, assessment and plan were thoroughly gone over with the resident.





Also on Exam:


Multiple patches of what appear to be Verruca Vulgaris on the bilateral 

posterior hand/knuckles area: patient was upset by my assessment of this and 

explained to me that this was "frost bite"





Reviewed patient labs, cultures, and chest x ray: I do not feel that this 

patient has Pneumonia and therefore IV antibiotics were discontinued.





She is stable for transfer to the detox unit.





Luis Antonio Beltran D.O.

## 2018-04-13 NOTE — PCM.BM
Treatment assets and liabiliti


Patient Assests: ADL independent, negotiates basic needs


Patient Liabilities: live alone, substance abuse





- Milieu Protocol


Maintain good personal hygiene: daily Encourage regular showers, daily Remind 

patient to perform daily oral care, daily Assist patient to perform ADL's


Conduct patient checks and document Observation sheet: Q15 minutes


Maintain personal safety: every shift Educate patient to report safety concerns 

to staff, every shift Monitor environment for contraband/sharps


Medication safety: Monitor for expected outcome, potential side effects: every 

shift, Assess barriers to learning: every shift, Assess readiness for 

medication education: every shift


Milieu Narrative: 





Taper with methadone


Gabapentin for augmentation if needed


As needed medications


All risks, benefits and alternatives of the meds discussed,


 and the pt agreed and understood. 


Attend groups and activities


Supportive therapy and psychoeducation


MI for abstinence


CBT for relapse prevention


Encourage MAT


Refer to rehab or IOP, and self-help groups


Smoking cessation with MI


Nicotine patch if needed


34 min





Discharge/Continuing Care





- Treatment Team Participation


Patient/Family/SO Statement: 





Taper with methadone


Gabapentin for augmentation if needed


As needed medications


All risks, benefits and alternatives of the meds discussed,


 and the pt agreed and understood. 


Attend groups and activities


Supportive therapy and psychoeducation


MI for abstinence


CBT for relapse prevention


Encourage MAT


Refer to rehab or IOP, and self-help groups


Smoking cessation with MI


Nicotine patch if needed


34 min

## 2018-04-14 VITALS
OXYGEN SATURATION: 98 % | TEMPERATURE: 97.5 F | DIASTOLIC BLOOD PRESSURE: 71 MMHG | HEART RATE: 64 BPM | RESPIRATION RATE: 20 BRPM | SYSTOLIC BLOOD PRESSURE: 107 MMHG

## 2018-04-14 LAB
ALBUMIN SERPL-MCNC: 3.6 G/DL (ref 3.5–5)
ALBUMIN/GLOB SERPL: 0.9 {RATIO} (ref 1–2.1)
ALT SERPL-CCNC: 8 U/L (ref 9–52)
AST SERPL-CCNC: 19 U/L (ref 14–36)
BASOPHILS # BLD AUTO: 0.1 K/UL (ref 0–0.2)
BASOPHILS NFR BLD: 1 % (ref 0–2)
BUN SERPL-MCNC: 15 MG/DL (ref 7–17)
CALCIUM SERPL-MCNC: 9.1 MG/DL (ref 8.6–10.4)
EOSINOPHIL # BLD AUTO: 0.1 K/UL (ref 0–0.7)
EOSINOPHIL NFR BLD: 2 % (ref 0–4)
ERYTHROCYTE [DISTWIDTH] IN BLOOD BY AUTOMATED COUNT: 14 % (ref 11.5–14.5)
GFR NON-AFRICAN AMERICAN: > 60
HGB BLD-MCNC: 13 G/DL (ref 11–16)
LYMPHOCYTES # BLD AUTO: 1.8 K/UL (ref 1–4.3)
LYMPHOCYTES NFR BLD AUTO: 27.1 % (ref 20–40)
MCH RBC QN AUTO: 28.1 PG (ref 27–31)
MCHC RBC AUTO-ENTMCNC: 33.5 G/DL (ref 33–37)
MCV RBC AUTO: 83.9 FL (ref 81–99)
MONOCYTES # BLD: 0.4 K/UL (ref 0–0.8)
MONOCYTES NFR BLD: 6.5 % (ref 0–10)
NEUTROPHILS # BLD: 4.1 K/UL (ref 1.8–7)
NEUTROPHILS NFR BLD AUTO: 63.4 % (ref 50–75)
NRBC BLD AUTO-RTO: 0.2 % (ref 0–2)
PLATELET # BLD: 367 K/UL (ref 130–400)
PMV BLD AUTO: 7.2 FL (ref 7.2–11.7)
RBC # BLD AUTO: 4.61 MIL/UL (ref 3.8–5.2)
WBC # BLD AUTO: 6.5 K/UL (ref 4.8–10.8)

## 2018-04-14 RX ADMIN — ENOXAPARIN SODIUM SCH MG: 40 INJECTION SUBCUTANEOUS at 10:45

## 2018-04-14 RX ADMIN — PANTOPRAZOLE SODIUM SCH MG: 40 TABLET, DELAYED RELEASE ORAL at 09:14

## 2018-04-14 NOTE — PCM.PYCHPN
Psychiatric Progress Note





- Psychiatric Progress Note


Patient seen today, length of contact: 16 min


Patient Chief Complaint: 





Consult for Heroin Withdrawal


Medication Change: Yes (detox changes daily)


Medical Record Reviewed: Yes





Mental Status Examination





- Cognitive Function


Orientation: Person, Place, Situation, Time


Memory: Intact


Attention: Poor


Concentration: Poor


Association: WNL


Fund of Knowledge: Poor





- Mood


Mood: Anxious





- Affect


Affect: Constricted





- Speech


Speech: Appropriate





- Formal Thought Process


Formal Thought Process: No Impairment





- Suicidal Ideation


Suicidal Ideation: No





- Homicidal Ideation


Homicidal Ideation: No





Goal/Treatment Plan





- Goal/Treatment Plan


Need for Continued Stay: Discharge may exacerbated symptoms, Severe functional 

impairment


Progress Toward Problem(s) and Goals/Treatment Plan: 





As per doctor and medical staff, patient is not showing any signs of withdrawal 

from opiates. 


Patient is trying to obtain methadone without showing symptoms of withdrawal.


Patient discussed with medical team.  








Pt psychiatrically stable and clear. 


Estimated Date of D/C: 04/17/18

## 2018-04-14 NOTE — PCM.PYCHDC
Mental Status Examination





- Mental Status Examination


Orientation: Person, Place, Situation, Time


Memory: Intact


Mood: Neutral


Affect: Constricted


Speech: Soft


Attention: WNL


Concentration: WNL


Association: WNL


Fund of Knowledge: WNL


Formal Thought Process: No Impairment


Description of patient's judgement and insight: 





good, fair


Psychotic Thoughts and Behaviors: 





denies any AVH


Suicidal Ideation: No


Current Homicidal Ideation?: No





Discharge Summary





- Discharge Note


Reason for Hospitalization: 





This is a 42 year old female, who is unemployed and homeless, who came to the 

ED on 4/11/2018 for cough. Patient has history of opioid use disorder. Patient 

states she uses 30 bags of heroin a day for the past two years; she is an IV 

drug user. She states she also uses cocaine but not every day. She denied using 

marijuana, PCP, pain pills, or xanax. Patient last used before arriving at the 

hospital. Patient attended detox at [facility] but left AMA after 2 

days of treatment. She states they were not detoxing her there. Patient does 

not currently have withdrawal symptoms. She continues to ask for methadone even 

after given 10mg by medicine team.





Patient denied past psychiatric hospitalizations. Patient denies hallucinations

, paranoia, and suicidal ideation. 





psych history: Opioid Use Disorder


medical history: denied


Social history: Patient is currently unemployed and homeless. She admits to 

smoking cigarettes 1.5 PPD. She consumes 1 beer per day. 








Laboratory Data: 





 Abnormal Lab Results











  04/12/18 04/14/18 04/14/18





  07:00 07:55 07:55


 


WBC   6.5 


 


RBC   4.61 


 


Hgb   13.0 


 


Hct   38.7 


 


MCV   83.9 


 


MCH   28.1 


 


MCHC   33.5 


 


RDW   14.0 


 


Plt Count   367 


 


MPV   7.2 


 


Neut % (Auto)   63.4 


 


Lymph % (Auto)   27.1 


 


Mono % (Auto)   6.5 


 


Eos % (Auto)   2.0 


 


Baso % (Auto)   1.0 


 


Neut # (Auto)   4.1 


 


Lymph # (Auto)   1.8 


 


Mono # (Auto)   0.4 


 


Eos # (Auto)   0.1 


 


Baso # (Auto)   0.1 


 


Sodium    143


 


Potassium    4.8


 


Chloride    105


 


Carbon Dioxide    25


 


Anion Gap    18


 


BUN    15


 


Creatinine    0.6 L


 


Est GFR ( Amer)    > 60


 


Est GFR (Non-Af Amer)    > 60


 


Random Glucose    98


 


Calcium    9.1


 


Phosphorus    4.9 H


 


Magnesium    2.1


 


Total Bilirubin    0.4


 


AST    19


 


ALT    8 L D


 


Alkaline Phosphatase    62


 


Total Protein    7.5


 


Albumin    3.6


 


Globulin    3.9


 


Albumin/Globulin Ratio    0.9 L


 


Mycoplasma pneumon IgM  Negative  











Consultations:: List each consultation separately and include:  1. Reason for 

request.  2. Findings.  3. Follow-up


Summary of Hospital Course include:: 1. Description of specific treatment plan 

utilized for patients during their course of treatmen.  2. Summarize the time-

course for resolution of acute symptoms and/or regressed behaviors.  3. 

Describe issues identified and worked on during hospitalization.  4. Describe 

medication utilized.  5. Describe medical problems identified and treated.  6. 

Reassessment of suicide risk


Summary of Hospital Course: 


This is a 42 year old female, who is unemployed and homeless, who came to the 

ED on 4/11/2018 for cough. Patient has history of opioid use disorder. Patient 

states she uses 30 bags of heroin a day for the past two years; she is an IV 

drug user. She states she also uses cocaine but not every day. She denied using 

marijuana, PCP, pain pills, or xanax. Patient last used before arriving at the 

hospital. Patient attended detox at [facility] but left AMA after 2 

days of treatment. She states they were not detoxing her there. Patient does 

not currently have withdrawal symptoms. She continues to ask for methadone even 

after given 10mg by medicine team.





Patient denied past psychiatric hospitalizations. Patient denies hallucinations

, paranoia, and suicidal ideation. 





psych history: Opioid Use Disorder


medical history: denied


Social history: Patient is currently unemployed and homeless. She admits to 

smoking cigarettes 1.5 PPD. She consumes 1 beer per day. 








- Final Diagnosis (DSM 5)


Condition upon Discharge: FAIR


DSM 5: 





Opioid Use Disorder, severe


Opiod withdrawal





Disposition: AGAINST MEDICAL ADVICE


Follow-up Treatment Plan: 





As per doctor and medical staff, patient is not showing any signs of withdrawal 

from opiates. 


Patient is trying to obtain methadone without showing symptoms of withdrawal.


Patient discussed with medical team.  








Pt psychiatrically stable and clear.